# Patient Record
Sex: FEMALE | Race: BLACK OR AFRICAN AMERICAN | NOT HISPANIC OR LATINO | URBAN - METROPOLITAN AREA
[De-identification: names, ages, dates, MRNs, and addresses within clinical notes are randomized per-mention and may not be internally consistent; named-entity substitution may affect disease eponyms.]

---

## 2017-06-20 ENCOUNTER — APPOINTMENT (OUTPATIENT)
Dept: GENERAL RADIOLOGY | Facility: CLINIC | Age: 22
End: 2017-06-20
Attending: EMERGENCY MEDICINE
Payer: COMMERCIAL

## 2017-06-20 ENCOUNTER — HOSPITAL ENCOUNTER (EMERGENCY)
Facility: CLINIC | Age: 22
Discharge: HOME | End: 2017-06-20
Attending: EMERGENCY MEDICINE | Admitting: EMERGENCY MEDICINE
Payer: COMMERCIAL

## 2017-06-20 VITALS
BODY MASS INDEX: 19.74 KG/M2 | TEMPERATURE: 98.8 F | WEIGHT: 115 LBS | OXYGEN SATURATION: 99 % | DIASTOLIC BLOOD PRESSURE: 72 MMHG | RESPIRATION RATE: 16 BRPM | SYSTOLIC BLOOD PRESSURE: 116 MMHG | HEART RATE: 84 BPM
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
## 2017-06-20 DIAGNOSIS — S62.306A CLOSED DISPLACED FRACTURE OF FIFTH METACARPAL BONE OF RIGHT HAND, UNSPECIFIED PORTION OF METACARPAL, INITIAL ENCOUNTER: ICD-10-CM

## 2017-06-20 PROCEDURE — 29125 APPL SHORT ARM SPLINT STATIC: CPT | Mod: Z6 | Performed by: EMERGENCY MEDICINE

## 2017-06-20 PROCEDURE — 73130 X-RAY EXAM OF HAND: CPT | Mod: LT

## 2017-06-20 PROCEDURE — 29125 APPL SHORT ARM SPLINT STATIC: CPT | Performed by: EMERGENCY MEDICINE

## 2017-06-20 PROCEDURE — 25000132 ZZH RX MED GY IP 250 OP 250 PS 637: Performed by: EMERGENCY MEDICINE

## 2017-06-20 PROCEDURE — 99284 EMERGENCY DEPT VISIT MOD MDM: CPT | Mod: 25 | Performed by: EMERGENCY MEDICINE

## 2017-06-20 PROCEDURE — 99283 EMERGENCY DEPT VISIT LOW MDM: CPT | Mod: 25 | Performed by: EMERGENCY MEDICINE

## 2017-06-20 RX ORDER — IBUPROFEN 600 MG/1
600 TABLET, FILM COATED ORAL ONCE
Status: COMPLETED | OUTPATIENT
Start: 2017-06-20 | End: 2017-06-20

## 2017-06-20 RX ORDER — OXYCODONE HYDROCHLORIDE 5 MG/1
5 TABLET ORAL ONCE
Status: COMPLETED | OUTPATIENT
Start: 2017-06-20 | End: 2017-06-20

## 2017-06-20 RX ORDER — OXYCODONE HYDROCHLORIDE 5 MG/1
5 TABLET ORAL EVERY 6 HOURS PRN
Qty: 20 TABLET | Refills: 0 | Status: SHIPPED | OUTPATIENT
Start: 2017-06-20

## 2017-06-20 RX ORDER — IBUPROFEN 600 MG/1
600 TABLET, FILM COATED ORAL EVERY 8 HOURS PRN
Qty: 30 TABLET | Refills: 0 | Status: SHIPPED | OUTPATIENT
Start: 2017-06-20

## 2017-06-20 RX ADMIN — OXYCODONE HYDROCHLORIDE 5 MG: 5 TABLET ORAL at 16:42

## 2017-06-20 RX ADMIN — IBUPROFEN 600 MG: 600 TABLET ORAL at 16:42

## 2017-06-20 ASSESSMENT — ENCOUNTER SYMPTOMS
HEADACHES: 0
NUMBNESS: 0
WEAKNESS: 0

## 2017-06-20 NOTE — ED AVS SNAPSHOT
[facility], Emergency Department    [address]    [facility] [address]    Phone:  [phone]    Fax:  [phone]                                       Shira Clark   MRN: [id number]    Department:  [facility], Emergency Department   Date of Visit:  6/20/2017           After Visit Summary Signature Page     I have received my discharge instructions, and my questions have been answered. I have discussed any challenges I see with this plan with the nurse or doctor.    ..........................................................................................................................................  Patient/Patient Representative Signature      ..........................................................................................................................................  Patient Representative Print Name and Relationship to Patient    ..................................................               ................................................  Date                                            Time    ..........................................................................................................................................  Reviewed by Signature/Title    ...................................................              ..............................................  Date                                                            Time

## 2017-06-20 NOTE — ED AVS SNAPSHOT
" [facility], Emergency Department    [address]    [facility] [address]    Phone:  [phone]    Fax:  [phone]                                       Shira Clark   MRN: [id number]    Department:  [facility], Emergency Department   Date of Visit:  6/20/2017           Patient Information     Date Of Birth          1995        Your diagnoses for this visit were:     Closed displaced fracture of fifth metacarpal bone of right hand, unspecified portion of metacarpal, initial encounter        You were seen by Nadine Babin MD.        Discharge Instructions           Please make an appointment to follow up with Orthopedics (phone: [phone]) in 3-5 days even if entirely better.  Keep you hand in the splint until seen by orthopedics.           Treating Hand Fractures  A fractured bone starts to heal on its own right away. But a treatment called reduction may help the fracture heal properly. Reduction is a process that repositions or \"sets\" the fracture. The goal is to get the fractured bone ends as close as possible to how they were before the injury. Your doctor will use one or more methods of reduction.     A splint and cast both limit movement. They keep your finger or hand in the best position for healing.   Closed reduction  If you have a clean break with little soft tissue damage, closed reduction will probably be used. Before the procedure, you may be given a light anesthetic to numb the area and possibly relax your muscles. Then your doctor manually readjusts the position of the broken bone. A splint or a cast will be worn while you heal.     A pin, screw, or plate with screws helps keep the bone stable and in place as it heals.   Open reduction  If you have an open fracture (bone sticking out through the skin), badly misaligned sections of bone, or severe tissue injury, open reduction is likely. A general anesthetic may be used during the surgery to let you sleep and relax " your muscles. Your doctor then makes one or more incisions to realign the bone and repair soft tissues. Pins, screws, plates, or a combination may be used to hold the bone in place during healing.  The road to healing  Fractures take from 4 weeks to 4 months to heal depending on the bone and severity of injury. Keeping your hand raised can control swelling, throbbing, and pain. Your doctor may prescribe medicine that can help reduce pain. Don t remove a splint or cast unless your doctor says you can. Call your doctor if your pain gets worse or if you notice any excess swelling or redness.   Date Last Reviewed: 9/8/2015 2000-2017 [employer]. [address] [place] [address], [place] [address]. All rights reserved. This information is not intended as a substitute for professional medical care. Always follow your healthcare professional's instructions.          24 Hour Appointment Hotline       To make an appointment at any [facility] clinic, call [phone] ([phone]). If you don't have a family doctor or clinic, we will help you find one. [facility] clinics are conveniently located to serve the needs of you and your family.          ED Discharge Orders     ORTHO  REFERRAL       [facility] is referring you to the Orthopedic  Services at [facility].       The  Representative will assist you in the coordination of your Orthopedic and Musculoskeletal Care as prescribed by your physician.    The  Representative will call you within 1 business day to help schedule your appointment, or you may contact the  Representative at:    All areas ~ [phone]     Type of Referral : Non Surgical       Timeframe requested: 3 - 5 days    Coverage of these services is subject to the terms and limitations of your health insurance plan.  Please call member services at your health plan with any benefit or coverage questions.      If  X-rays, CT or MRI's have been performed, please contact the facility where they were done to arrange for , prior to your scheduled appointment.  Please bring this referral request to your appointment and present it to your specialist.                     Review of your medicines      START taking        Dose / Directions Last dose taken    ibuprofen 600 MG tablet   Commonly known as:  ADVIL/MOTRIN   Dose:  600 mg   Quantity:  30 tablet        Take 1 tablet (600 mg) by mouth every 8 hours as needed for moderate pain   Refills:  0        oxyCODONE 5 MG IR tablet   Commonly known as:  ROXICODONE   Dose:  5 mg   Quantity:  20 tablet        Take 1 tablet (5 mg) by mouth every 6 hours as needed for pain   Refills:  0          Our records show that you are taking the medicines listed below. If these are incorrect, please call your family doctor or clinic.        Dose / Directions Last dose taken    ferrous sulfate 325 (65 FE) MG tablet   Commonly known as:  IRON   Dose:  1 tablet   Quantity:  60 tablet        Take 1 tablet by mouth 2 times daily.   Refills:  2                Prescriptions were sent or printed at these locations (2 Prescriptions)                   Other Prescriptions                Printed at Department/Unit printer (2 of 2)         oxyCODONE (ROXICODONE) 5 MG IR tablet               ibuprofen (ADVIL/MOTRIN) 600 MG tablet                Procedures and tests performed during your visit     Hand XR, G/E 3 views, left      Orders Needing Specimen Collection     None      Pending Results     No orders found from 6/18/2017 to 6/21/2017.            Pending Culture Results     No orders found from 6/18/2017 to 6/21/2017.            Pending Results Instructions     If you had any lab results that were not finalized at the time of your Discharge, you can call the ED Lab Result RN at [phone]. You will be contacted by this team for any positive Lab results or changes in treatment. The nurses are available  "7 days a week from 10A to 6:30P.  You can leave a message 24 hours per day and they will return your call.        Thank you for choosing [facility]       Thank you for choosing [facility] for your care. Our goal is always to provide you with excellent care. Hearing back from our patients is one way we can continue to improve our services. Please take a few minutes to complete the written survey that you may receive in the mail after you visit with us. Thank you!        [employer] Information     [employer] lets you send messages to your doctor, view your test results, renew your prescriptions, schedule appointments and more. To sign up, go to [email or web address] . Click on \"Log in\" on the left side of the screen, which will take you to the Welcome page. Then click on \"Sign up Now\" on the right side of the page.     You will be asked to enter the access code listed below, as well as some personal information. Please follow the directions to create your username and password.     Your access code is: [email or web address]  Expires: 2017  5:39 PM     Your access code will  in 90 days. If you need help or a new code, please call your [facility] clinic or [phone].        Care EveryWhere ID     This is your Care EveryWhere ID. This could be used by other organizations to access your [facility] medical records  [id number]        After Visit Summary       This is your record. Keep this with you and show to your community pharmacist(s) and doctor(s) at your next visit.                  "

## 2017-06-20 NOTE — DISCHARGE INSTRUCTIONS
"    Please make an appointment to follow up with Orthopedics (phone: [phone]) in 3-5 days even if entirely better.  Keep you hand in the splint until seen by orthopedics.           Treating Hand Fractures  A fractured bone starts to heal on its own right away. But a treatment called reduction may help the fracture heal properly. Reduction is a process that repositions or \"sets\" the fracture. The goal is to get the fractured bone ends as close as possible to how they were before the injury. Your doctor will use one or more methods of reduction.     A splint and cast both limit movement. They keep your finger or hand in the best position for healing.   Closed reduction  If you have a clean break with little soft tissue damage, closed reduction will probably be used. Before the procedure, you may be given a light anesthetic to numb the area and possibly relax your muscles. Then your doctor manually readjusts the position of the broken bone. A splint or a cast will be worn while you heal.     A pin, screw, or plate with screws helps keep the bone stable and in place as it heals.   Open reduction  If you have an open fracture (bone sticking out through the skin), badly misaligned sections of bone, or severe tissue injury, open reduction is likely. A general anesthetic may be used during the surgery to let you sleep and relax your muscles. Your doctor then makes one or more incisions to realign the bone and repair soft tissues. Pins, screws, plates, or a combination may be used to hold the bone in place during healing.  The road to healing  Fractures take from 4 weeks to 4 months to heal depending on the bone and severity of injury. Keeping your hand raised can control swelling, throbbing, and pain. Your doctor may prescribe medicine that can help reduce pain. Don t remove a splint or cast unless your doctor says you can. Call your doctor if your pain gets worse or if you notice any excess swelling or redness. "   Date Last Reviewed: 9/8/2015 2000-2017 [employer]. [address] [place] [address], [place] [address]. All rights reserved. This information is not intended as a substitute for professional medical care. Always follow your healthcare professional's instructions.

## 2017-06-20 NOTE — ED PROVIDER NOTES
History     Chief Complaint   Patient presents with     Hand Injury     Fell down 5-6 stairs at [facility] injuring right.     KEARA Clark is a 22 year old female who presents for evaluation of a right hand injury. Patient reports she was at the club last night when she fell down the stairs and hit her right hand against a railing. Currently, she complains of right hand pain and swelling. She denies any other injuries from the fall as her boyfriend caught her prior to her completely falling. She is right hand dominant.     I have reviewed the Medications, Allergies, Past Medical and Surgical History, and Social History in the [facility] system.  Past Medical History:   Diagnosis Date     Anemia      Menarche age 13    Cycles Q 25-26d x 5 days       Past Surgical History:   Procedure Laterality Date     NO HISTORY OF SURGERY         Family History   Problem Relation Age of Onset     CANCER Maternal Grandmother        Social History   Substance Use Topics     Smoking status: Former Smoker     Smokeless tobacco: Never Used     Alcohol use Yes      Comment: social     No current facility-administered medications for this encounter.      Current Outpatient Prescriptions   Medication     ferrous sulfate 325 (65 FE) MG tablet        Allergies   Allergen Reactions     Hydrocodone Hives       Review of Systems   Musculoskeletal:        Positive for right hand pain & swelling   Neurological: Negative for weakness, numbness and headaches.   All other systems reviewed and are negative.      Physical Exam   BP: 127/76  Pulse: 107  Temp: 98.8  F (37.1  C)  Resp: 16  Weight: 52.2 kg (115 lb)  SpO2: 100 %  Physical Exam   Musculoskeletal:   Tender right hand and her 5th metacarpal region.  Pain with movement of her 5th and 4th finger.  No pain with movement of her thumb and index finger.  Mild swelling in the metacarpal region.  No abrasions or skin changes.   Physical Exam   Constitutional: oriented to person, place, and time. appears  well-developed and well-nourished.   HENT:   Head: Normocephalic and atraumatic.   Neck: Normal range of motion.   Pulmonary/Chest: Effort normal. No respiratory distress.   Neurological: alert and oriented to person, place, and time.   Skin: Skin is warm and dry.   Psychiatric:  normal mood and affect.  behavior is normal. Thought content normal.       ED Course     ED Course     Orthopedic injury tx  Date/Time: 6/20/2017 5:55 PM  Performed by: HERMANN BARLOW  Authorized by: HERMANN BARLOW   Consent: Verbal consent obtained. Written consent not obtained.  Consent given by: patient  Patient understanding: patient states understanding of the procedure being performed  Imaging studies: imaging studies available  Patient identity confirmed: verbally with patient  Injury location: hand  Location details: right hand  Injury type: fracture  Fracture type: fifth metacarpal  Pre-procedure neurovascular assessment: neurovascularly intact  Pre-procedure distal perfusion: normal  Pre-procedure neurological function: normal  Pre-procedure range of motion: normal    Anesthesia:  Local anesthesia used: no  Sedation:  Patient sedated: no    Manipulation performed: no  Immobilization: splint  Splint type: volar short arm  Post-procedure neurovascular assessment: post-procedure neurovascularly intact  Post-procedure distal perfusion: normal  Post-procedure range of motion: normal  Patient tolerance: Patient tolerated the procedure well with no immediate complications             3:41 PM  The patient was seen and examined by Dr. Barlow in Room 4.          Critical Care time:  none               Labs Ordered and Resulted from Time of ED Arrival Up to the Time of Departure from the ED - No data to display         Assessments & Plan (with Medical Decision Making): Patient is a 22-year-old female who presented to the ER due to right hand pain after falling at the club last night.  Patient has a mildly displaced 5th  metacarpal fracture.  We ll place the patient in a gutter splint and discharge her home.  Patient to follow-up with Orthopedics for further care.       I have reviewed the nursing notes.    I have reviewed the findings, diagnosis, plan and need for follow up with the patient.    New Prescriptions    No medications on file       Final diagnoses:   Closed displaced fracture of fifth metacarpal bone of right hand, unspecified portion of metacarpal, initial encounter   I, Angela Jain, am serving as a trained medical scribe to document services personally performed by Nadine Babin MD, based on the provider's statements to me.   I, Nadine Babin MD, was physically present and have reviewed and verified the accuracy of this note documented by Angela Jain.      6/20/2017   [facility], [place], EMERGENCY DEPARTMENT     Nadine Babin MD  06/20/17 3840

## 2024-12-31 ENCOUNTER — LAB REQUISITION (OUTPATIENT)
Dept: LAB | Facility: CLINIC | Age: 29
End: 2024-12-31
Payer: COMMERCIAL

## 2024-12-31 DIAGNOSIS — R11.2 NAUSEA WITH VOMITING, UNSPECIFIED: ICD-10-CM

## 2024-12-31 DIAGNOSIS — R09.81 NASAL CONGESTION: ICD-10-CM

## 2024-12-31 PROCEDURE — 87086 URINE CULTURE/COLONY COUNT: CPT | Mod: ORL | Performed by: FAMILY MEDICINE

## 2024-12-31 PROCEDURE — 87086 URINE CULTURE/COLONY COUNT: CPT | Performed by: FAMILY MEDICINE

## 2025-01-01 LAB — BACTERIA UR CULT: NORMAL
